# Patient Record
Sex: MALE | Race: BLACK OR AFRICAN AMERICAN | NOT HISPANIC OR LATINO | Employment: OTHER | URBAN - METROPOLITAN AREA
[De-identification: names, ages, dates, MRNs, and addresses within clinical notes are randomized per-mention and may not be internally consistent; named-entity substitution may affect disease eponyms.]

---

## 2022-07-28 ENCOUNTER — HOSPITAL ENCOUNTER (EMERGENCY)
Facility: HOSPITAL | Age: 77
Discharge: HOME | End: 2022-07-28
Attending: EMERGENCY MEDICINE
Payer: MEDICARE

## 2022-07-28 VITALS
HEART RATE: 61 BPM | DIASTOLIC BLOOD PRESSURE: 85 MMHG | SYSTOLIC BLOOD PRESSURE: 179 MMHG | HEIGHT: 70 IN | BODY MASS INDEX: 25.48 KG/M2 | TEMPERATURE: 98 F | WEIGHT: 178 LBS | RESPIRATION RATE: 14 BRPM | OXYGEN SATURATION: 97 %

## 2022-07-28 DIAGNOSIS — R91.1 PULMONARY NODULE: ICD-10-CM

## 2022-07-28 DIAGNOSIS — R53.83 FATIGUE, UNSPECIFIED TYPE: Primary | ICD-10-CM

## 2022-07-28 DIAGNOSIS — R53.83 FATIGUE: ICD-10-CM

## 2022-07-28 DIAGNOSIS — R93.89 ABNORMAL CXR: ICD-10-CM

## 2022-07-28 LAB
ALBUMIN SERPL BCP-MCNC: 3.7 G/DL (ref 3.5–5.2)
ALP SERPL-CCNC: 66 U/L (ref 55–135)
ALT SERPL W/O P-5'-P-CCNC: 17 U/L (ref 10–44)
ANION GAP SERPL CALC-SCNC: 9 MMOL/L (ref 8–16)
AST SERPL-CCNC: 21 U/L (ref 10–40)
BASOPHILS # BLD AUTO: 0.02 K/UL (ref 0–0.2)
BASOPHILS NFR BLD: 0.3 % (ref 0–1.9)
BILIRUB SERPL-MCNC: 1.2 MG/DL (ref 0.1–1)
BILIRUB UR QL STRIP: NEGATIVE
BNP SERPL-MCNC: 32 PG/ML (ref 0–99)
BUN SERPL-MCNC: 10 MG/DL (ref 8–23)
CALCIUM SERPL-MCNC: 9.6 MG/DL (ref 8.7–10.5)
CHLORIDE SERPL-SCNC: 107 MMOL/L (ref 95–110)
CLARITY UR: CLEAR
CO2 SERPL-SCNC: 26 MMOL/L (ref 23–29)
COLOR UR: YELLOW
CREAT SERPL-MCNC: 0.9 MG/DL (ref 0.5–1.4)
CTP QC/QA: YES
DIFFERENTIAL METHOD: ABNORMAL
EOSINOPHIL # BLD AUTO: 0.1 K/UL (ref 0–0.5)
EOSINOPHIL NFR BLD: 1.3 % (ref 0–8)
ERYTHROCYTE [DISTWIDTH] IN BLOOD BY AUTOMATED COUNT: 12.2 % (ref 11.5–14.5)
EST. GFR  (AFRICAN AMERICAN): >60 ML/MIN/1.73 M^2
EST. GFR  (NON AFRICAN AMERICAN): >60 ML/MIN/1.73 M^2
GLUCOSE SERPL-MCNC: 104 MG/DL (ref 70–110)
GLUCOSE UR QL STRIP: NEGATIVE
HCT VFR BLD AUTO: 40.6 % (ref 40–54)
HGB BLD-MCNC: 14.1 G/DL (ref 14–18)
HGB UR QL STRIP: NEGATIVE
IMM GRANULOCYTES # BLD AUTO: 0.01 K/UL (ref 0–0.04)
IMM GRANULOCYTES NFR BLD AUTO: 0.2 % (ref 0–0.5)
KETONES UR QL STRIP: NEGATIVE
LEUKOCYTE ESTERASE UR QL STRIP: NEGATIVE
LYMPHOCYTES # BLD AUTO: 1.1 K/UL (ref 1–4.8)
LYMPHOCYTES NFR BLD: 17.2 % (ref 18–48)
MAGNESIUM SERPL-MCNC: 1.7 MG/DL (ref 1.6–2.6)
MCH RBC QN AUTO: 29.9 PG (ref 27–31)
MCHC RBC AUTO-ENTMCNC: 34.7 G/DL (ref 32–36)
MCV RBC AUTO: 86 FL (ref 82–98)
MONOCYTES # BLD AUTO: 0.5 K/UL (ref 0.3–1)
MONOCYTES NFR BLD: 8.3 % (ref 4–15)
NEUTROPHILS # BLD AUTO: 4.6 K/UL (ref 1.8–7.7)
NEUTROPHILS NFR BLD: 72.7 % (ref 38–73)
NITRITE UR QL STRIP: NEGATIVE
NRBC BLD-RTO: 0 /100 WBC
PH UR STRIP: 7 [PH] (ref 5–8)
PLATELET # BLD AUTO: 161 K/UL (ref 150–450)
PMV BLD AUTO: 9.1 FL (ref 9.2–12.9)
POTASSIUM SERPL-SCNC: 3.8 MMOL/L (ref 3.5–5.1)
PROT SERPL-MCNC: 6.6 G/DL (ref 6–8.4)
PROT UR QL STRIP: NEGATIVE
RBC # BLD AUTO: 4.72 M/UL (ref 4.6–6.2)
SARS-COV-2 RDRP RESP QL NAA+PROBE: NEGATIVE
SODIUM SERPL-SCNC: 142 MMOL/L (ref 136–145)
SP GR UR STRIP: 1.02 (ref 1–1.03)
TROPONIN I SERPL DL<=0.01 NG/ML-MCNC: 0.01 NG/ML (ref 0–0.03)
TSH SERPL DL<=0.005 MIU/L-ACNC: 1.96 UIU/ML (ref 0.4–4)
URN SPEC COLLECT METH UR: ABNORMAL
UROBILINOGEN UR STRIP-ACNC: ABNORMAL EU/DL
WBC # BLD AUTO: 6.29 K/UL (ref 3.9–12.7)

## 2022-07-28 PROCEDURE — 93005 ELECTROCARDIOGRAM TRACING: CPT

## 2022-07-28 PROCEDURE — 84443 ASSAY THYROID STIM HORMONE: CPT | Performed by: EMERGENCY MEDICINE

## 2022-07-28 PROCEDURE — U0002 COVID-19 LAB TEST NON-CDC: HCPCS | Performed by: EMERGENCY MEDICINE

## 2022-07-28 PROCEDURE — 93010 ELECTROCARDIOGRAM REPORT: CPT | Mod: ,,, | Performed by: INTERNAL MEDICINE

## 2022-07-28 PROCEDURE — 84484 ASSAY OF TROPONIN QUANT: CPT | Performed by: EMERGENCY MEDICINE

## 2022-07-28 PROCEDURE — 80053 COMPREHEN METABOLIC PANEL: CPT | Performed by: EMERGENCY MEDICINE

## 2022-07-28 PROCEDURE — 81003 URINALYSIS AUTO W/O SCOPE: CPT | Performed by: EMERGENCY MEDICINE

## 2022-07-28 PROCEDURE — 93010 EKG 12-LEAD: ICD-10-PCS | Mod: ,,, | Performed by: INTERNAL MEDICINE

## 2022-07-28 PROCEDURE — 99285 EMERGENCY DEPT VISIT HI MDM: CPT | Mod: 25

## 2022-07-28 PROCEDURE — 85025 COMPLETE CBC W/AUTO DIFF WBC: CPT | Performed by: EMERGENCY MEDICINE

## 2022-07-28 PROCEDURE — 83880 ASSAY OF NATRIURETIC PEPTIDE: CPT | Performed by: EMERGENCY MEDICINE

## 2022-07-28 PROCEDURE — 83735 ASSAY OF MAGNESIUM: CPT | Performed by: EMERGENCY MEDICINE

## 2022-07-28 NOTE — ED PROVIDER NOTES
"Encounter Date: 7/28/2022    SCRIBE #1 NOTE: I, Sabi Sands, am scribing for, and in the presence of,  Helen Angulo MD. I have scribed the following portions of the note - Other sections scribed: HPI, ROS, PE.       History     Chief Complaint   Patient presents with    Fall     Pt presents to ED c/o fall occurred around 2100 last night. Pt also c/o neck pain. Pt reports hitting the left side of the head on concrete.  Pt reports taking out the trash.  Denies loc, n/v, ha, dizziness, cp, sob, numbness or tingling.  Denies taking blood thinner or taking any medication to relieve the pain. 5/10.      CC: Fall    HPI: This is a 76 y.o. M who presents to the ED for emergent evaluation of acute headache, and mid posterior neck pain after a slip and fall while taking out the garbage at 9:00pm last night. Pt reports hitting his head on the concrete during the fall. He was assisted up off of the ground after the fall. He denies loss of consciousness, or any other trauma during the fall. Per wife, the pt was "out of it" after the fall. The pt normally speaks slow. However, per wife, the pt's speech was slower than usual after the fall. He was monitored by his wife after the fall and the pt's wife allowed the pt to fall asleep 2 hours after the fall. His speech has improved since the fall. He appears groggy since the fall, which also prompted today's ED visit. Per wife, the pt has a Hx of chronic neck pain and believes the neck pain was aggravated after the fall. The pt had a fusion of the neck 10-15 years ago. Pt denies numbness, weakness, or vision changes. Pt has HTN, and high cholesterol. He takes high cholesterol medication, Flomax, Amlodipine, aspirin, Meloxicam, HCTZ, and medication for prostate issue. He is not on blood thinners. He has a Hx of a lung biopsy in the 1970's, in which he had sarcoids and was on prednisone for 2 years. The pt no longer takes prednisone. He has no known drug allergies. He consumes a " glass of wine occasionally, but he last consumed wine 2 weeks ago. The pt was experiencing night sweats, which he was evaluated by his PCP for the night sweats and informed that the night sweats were likely due to consuming wine before bedtime. Since he stopped drinking wine 2 weeks ago, the pt has not had night sweats in 1 week. Pt denies tobacco use, or recreational drug use. Additionally, the pt's wife reports that the pt's blood pressure has been 105/50's for the last few months. She states that his systolic is usually in the 120's. She was concerned about the low blood pressure, so she has not been giving the pt his blood pressure medication lately. Besides voluntarily stopping his blood pressure medication, the pt has not had any recent changes to his medications. The pt has also been generally weak intermittently for a while (6 months), which the pt's wife was concerned that the weakness was due to the low blood pressure. He currently denies weakness. The pt lost 15 lbs within the last 15 months, but was told by his PCP that the weight loss was not concerning. He had labs done by his PCP after concerns of weight loss. His labs were normal at that time. He has an appointment with his PCP, Dr. Gr at [facility] on tomorrow 7/29/2022. Pt denies CP, SOB, or palpitations.    The history is provided by the patient and the spouse. No  was used.     Review of patient's allergies indicates:  No Known Allergies  No past medical history on file.  No past surgical history on file.  No family history on file.     Review of Systems   Constitutional: Positive for fatigue and unexpected weight change. Negative for chills, diaphoresis and fever.   HENT: Negative for drooling, sore throat and voice change.    Eyes: Negative for photophobia and visual disturbance.   Respiratory: Negative for cough, shortness of breath and wheezing.    Cardiovascular: Negative for chest pain, palpitations  and leg swelling.   Gastrointestinal: Negative for abdominal pain, blood in stool, constipation, diarrhea, nausea and vomiting.   Genitourinary: Negative for dysuria, frequency, hematuria and urgency.   Musculoskeletal: Positive for neck pain. Negative for myalgias and neck stiffness.   Skin: Negative for rash and wound.   Neurological: Positive for speech difficulty (resolved) and headaches. Negative for syncope, weakness, light-headedness and numbness.   Hematological: Does not bruise/bleed easily.   Psychiatric/Behavioral: Negative for agitation, confusion and suicidal ideas.   All other systems reviewed and are negative.      Physical Exam     Initial Vitals   BP Pulse Resp Temp SpO2   07/28/22 0645 07/28/22 0645 07/28/22 0645 07/28/22 0645 07/28/22 0721   120/68 72 18 98.1 °F (36.7 °C) 97 %      MAP       --                Physical Exam    Nursing note and vitals reviewed.  Constitutional: He appears well-developed and well-nourished. He is not diaphoretic. No distress.   HENT:   Head: Normocephalic and atraumatic.   Right Ear: External ear normal.   Left Ear: External ear normal.   Mouth/Throat: Oropharynx is clear and moist. No oropharyngeal exudate.   No signs of head trauma    Eyes: Conjunctivae and EOM are normal. Pupils are equal, round, and reactive to light. Right eye exhibits no discharge. Left eye exhibits no discharge.   Neck: Neck supple. No JVD present.   Normal range of motion.   Full passive range of motion without pain.     Cardiovascular: Normal rate, regular rhythm, normal heart sounds and intact distal pulses. Exam reveals no gallop and no friction rub.    No murmur heard.  Pulmonary/Chest: Breath sounds normal. No respiratory distress. He has no wheezes. He has no rhonchi. He has no rales.   Abdominal: Abdomen is soft. Bowel sounds are normal. He exhibits no distension. There is no abdominal tenderness. There is no rebound and no guarding.   Musculoskeletal:         General: No tenderness or  edema. Normal range of motion.      Cervical back: Full passive range of motion without pain, normal range of motion and neck supple. No spinous process tenderness or muscular tenderness.      Comments: No midline neck or back tenderness      Lymphadenopathy:     He has no cervical adenopathy.   Neurological: He is alert and oriented to person, place, and time. He has normal strength. No cranial nerve deficit or sensory deficit. GCS score is 15. GCS eye subscore is 4. GCS verbal subscore is 5. GCS motor subscore is 6.   Moves all extremities and carries on conversation. CN- II: PERRL; III/IV/VI: EOMI w/out evidence of nystagmus; V: no deficits appreciated to light touch bilateral face; VII: no facial weakness, no facial asymmetry. Eyebrow raise symmetric. Smile symmetric; IX/X: palate midline, and raises symmetrically; XI: shoulder shrug 5/5 bilaterally; XII: tongue is midline w/out asymmetry. Strength 5/5 to bilateral upper and lower extremities, sensation intact to light touch,   Skin: Skin is dry. Capillary refill takes less than 2 seconds.   Psychiatric: He has a normal mood and affect. Thought content normal.         ED Course   Procedures  Labs Reviewed   CBC W/ AUTO DIFFERENTIAL - Abnormal; Notable for the following components:       Result Value    MPV 9.1 (*)     Lymph % 17.2 (*)     All other components within normal limits   COMPREHENSIVE METABOLIC PANEL - Abnormal; Notable for the following components:    Total Bilirubin 1.2 (*)     All other components within normal limits   URINALYSIS, REFLEX TO URINE CULTURE - Abnormal; Notable for the following components:    Urobilinogen, UA 2.0-3.0 (*)     All other components within normal limits    Narrative:     Specimen Source->Urine   MAGNESIUM   TSH   TROPONIN I   B-TYPE NATRIURETIC PEPTIDE   B-TYPE NATRIURETIC PEPTIDE   SARS-COV-2 RDRP GENE          Imaging Results          X-Ray Chest 1 View (Final result)  Result time 07/28/22 08:33:36   Procedure changed  from X-Ray Chest PA And Lateral     Final result by Russell Patel MD (07/28/22 08:33:36)                 Impression:      Nonspecific interstitial coarsening could relate to chronic fibrotic change, in the absence comparison imaging, possibility of infectious or inflammatory etiology difficult to entirely exclude.      Electronically signed by: Russell Patel  Date:    07/28/2022  Time:    08:33             Narrative:    EXAMINATION:  XR CHEST 1 VIEW    CLINICAL HISTORY:  fatigue; Other fatigue    TECHNIQUE:  Single frontal view of the chest was performed.    COMPARISON:  None    FINDINGS:  Monitoring leads overlie the chest.  Cardiomediastinal contours grossly within normal limits.    Nonspecific interstitial coarsening.    No definite pneumothorax or large volume pleural effusion.  No acute findings identified in the visualized abdomen.  Osseous and soft tissue structures appear without definite acute abnormality.                               CT Head Without Contrast (Final result)  Result time 07/28/22 08:26:50    Final result by Russell Patel MD (07/28/22 08:26:50)                 Impression:      1. No acute intracranial findings.  2. No acute cervical spine fracture.  3. Degenerative findings the cervical spine, as described.  4. Incidental left upper lobe solid nodule measuring For a solid nodule <6 mm, Fleischner Society 2017 guidelines recommend no routine follow up for a low risk patient, or follow-up with non-contrast chest CT at 12 months in a high risk patient.      Electronically signed by: Russell Patel  Date:    07/28/2022  Time:    08:26             Narrative:    EXAMINATION:  CT HEAD WITHOUT CONTRAST; CT CERVICAL SPINE WITHOUT CONTRAST    CLINICAL HISTORY:  Head trauma, minor (Age >= 65y);; Neck trauma (Age >= 65y);    TECHNIQUE:  Low dose axial images were obtained through the head and cervical spine.  Coronal and sagittal reformations were also performed. Contrast was not  administered.    COMPARISON:  None.    FINDINGS:  Head:    Blood: No acute intracranial hemorrhage.    Parenchyma: No definite loss of gray-white differentiation to suggest acute or subacute transcortical infarct. Generalized pattern age-related parenchymal volume loss.  Nonspecific areas of white matter hypoattenuation could relate to sequela of chronic small vessel ischemic disease.    Ventricles/Extra-axial spaces: No abnormal extra-axial fluid collection. Basal cisterns are patent.    Vessels: Atherosclerosis    Orbits: Unremarkable.    Scalp: High left paramedian vertex scalp hematoma.    Skull: There are no depressed skull fractures or destructive bone lesions.    Sinuses and mastoids: Partially imaged chronic appearing essentially complete opacification of the right maxillary sinus, which could relate to mucocele.  Hypoplastic right frontal sinus.  Scattered mucosal thickening retention cysts noted elsewhere.    Other findings: TMJ DJD.    Cervical spine:    Fractures: No acute fractures    Alignment: There is no significant vertebral subluxation  Atlanto-axial and atlanto-occipital joints: Atlanto-axial and atlanto-occipital intervals are not widened.  Facet joints: There is no traumatic facet joint widening.  Degenerate facet arthropathy.  Vertebral bodies: Osseous demineralization is suggested.  Degenerative endplate change.  Ankylosis of C6-7 vertebral bodies.  Discs: Disc osteophyte complex formation  Spinal canal and foraminal narrowing: Although CT does not optimally evaluate the soft tissue contents of the spinal canal and foramina, no critical stenosis is suggested.    At C2-3, moderate severe left foraminal narrowing.    At C3-4, severe left foraminal narrowing and mild ventral thecal sac deformity.    At C4-5, mild thecal sac deformity and mild right and moderate left foraminal narrowing.    At C5-6, mild-to-moderate central spinal canal stenosis and moderate right and severe left foraminal  narrowing.    At C6-7 mild bilateral foraminal narrowing.  Mild ventral thecal sac deformity.  Paraspinal soft tissues: Atherosclerosis.    Upper Lungs:Biapical pleuroparenchymal scarring.  More focal 5-6 mm solid nodule left upper lobe (series 3, image 279).                               CT Cervical Spine Without Contrast (Final result)  Result time 07/28/22 08:26:50    Final result by Russell Patel MD (07/28/22 08:26:50)                 Impression:      1. No acute intracranial findings.  2. No acute cervical spine fracture.  3. Degenerative findings the cervical spine, as described.  4. Incidental left upper lobe solid nodule measuring For a solid nodule <6 mm, Fleischner Society 2017 guidelines recommend no routine follow up for a low risk patient, or follow-up with non-contrast chest CT at 12 months in a high risk patient.      Electronically signed by: Russell Patel  Date:    07/28/2022  Time:    08:26             Narrative:    EXAMINATION:  CT HEAD WITHOUT CONTRAST; CT CERVICAL SPINE WITHOUT CONTRAST    CLINICAL HISTORY:  Head trauma, minor (Age >= 65y);; Neck trauma (Age >= 65y);    TECHNIQUE:  Low dose axial images were obtained through the head and cervical spine.  Coronal and sagittal reformations were also performed. Contrast was not administered.    COMPARISON:  None.    FINDINGS:  Head:    Blood: No acute intracranial hemorrhage.    Parenchyma: No definite loss of gray-white differentiation to suggest acute or subacute transcortical infarct. Generalized pattern age-related parenchymal volume loss.  Nonspecific areas of white matter hypoattenuation could relate to sequela of chronic small vessel ischemic disease.    Ventricles/Extra-axial spaces: No abnormal extra-axial fluid collection. Basal cisterns are patent.    Vessels: Atherosclerosis    Orbits: Unremarkable.    Scalp: High left paramedian vertex scalp hematoma.    Skull: There are no depressed skull fractures or destructive bone  lesions.    Sinuses and mastoids: Partially imaged chronic appearing essentially complete opacification of the right maxillary sinus, which could relate to mucocele.  Hypoplastic right frontal sinus.  Scattered mucosal thickening retention cysts noted elsewhere.    Other findings: TMJ DJD.    Cervical spine:    Fractures: No acute fractures    Alignment: There is no significant vertebral subluxation  Atlanto-axial and atlanto-occipital joints: Atlanto-axial and atlanto-occipital intervals are not widened.  Facet joints: There is no traumatic facet joint widening.  Degenerate facet arthropathy.  Vertebral bodies: Osseous demineralization is suggested.  Degenerative endplate change.  Ankylosis of C6-7 vertebral bodies.  Discs: Disc osteophyte complex formation  Spinal canal and foraminal narrowing: Although CT does not optimally evaluate the soft tissue contents of the spinal canal and foramina, no critical stenosis is suggested.    At C2-3, moderate severe left foraminal narrowing.    At C3-4, severe left foraminal narrowing and mild ventral thecal sac deformity.    At C4-5, mild thecal sac deformity and mild right and moderate left foraminal narrowing.    At C5-6, mild-to-moderate central spinal canal stenosis and moderate right and severe left foraminal narrowing.    At C6-7 mild bilateral foraminal narrowing.  Mild ventral thecal sac deformity.  Paraspinal soft tissues: Atherosclerosis.    Upper Lungs:Biapical pleuroparenchymal scarring.  More focal 5-6 mm solid nodule left upper lobe (series 3, image 279).                                 Medications - No data to display  Medical Decision Making:   Independently Interpreted Test(s):   I have ordered and independently interpreted EKG Reading(s) - see summary below  77 yo patient presenting 2/2 mechanical fall. Patient with pain predominantly to the neck. Hemodynamically stable with a non focal neurological exam. Given exam and history, low suspicion for major  traumatic event. CT head and neck without acute traumatic injury. Serial abdominal exams without tenderness. Observed in the ED for 6 hours with no instability. Stable gait. Patient refused pain medication    Patient also with chronic fatigue, night sweats, weight loss, workup in ED without obvious cause. DDx includes malignancy, TB, metabolic derangement, thyroid dysfunction, anemia.     Discussed with patient CT and xray results (pulm nodule, abnormal CXR) and need to follow up outpatient. PCP visit already scheduled for tomorrow.     Cautious return precautions discussed with patient and/or family with understanding. Prompt f/u with primary care physician discussed. All questions answered. Patient comfortable with plan.               Scribe Attestation:   Scribe #1: I performed the above scribed service and the documentation accurately describes the services I performed. I attest to the accuracy of the note.        ED Course as of 07/28/22 1503   Thu Jul 28, 2022   0736 EKG 12-lead  Sinus bradycardia 59. No ST elevation or depression.  Normal axis.  QTC is 413. T-wave inversions in 3 and V1.   [JT]      ED Course User Index  [JT] Helen Angulo MD           I, Helen Angulo, personally performed the services described in this documentation. All medical record entries made by the scribe were at my direction and in my presence. I have reviewed the chart and agree that the record reflects my personal performance and is accurate and complete.  Clinical Impression:   Final diagnoses:  [R53.83] Fatigue  [R53.83] Fatigue, unspecified type (Primary)  [R93.89] Abnormal CXR  [R91.1] Pulmonary nodule          ED Disposition Condition    Discharge Stable        ED Prescriptions     None        Follow-up Information     Follow up With Specialties Details Why Contact Info    [facility] Emergency Medicine  As needed, If symptoms worsen [address] [place] [address] [phone]    Your  Primary Care Doctor  Go in 1 day as previously scheduled            Helen Angulo MD  07/28/22 6291

## 2022-07-28 NOTE — ED NOTES
Pt awaiting wheelchair for discharge. No wheelchair at this time. Transport called. Charge nurse aware.

## 2022-07-28 NOTE — DISCHARGE INSTRUCTIONS
Please follow up with primary care doctor tomorrow to discuss fatigue for further work up. Discuss pulmonary nodule and abnormal CXR as this will need follow up outpatient. Return to ED for any worsening symptoms, chest pain, shortness of breath, fever or any other concerns.     Thank you for coming to our Emergency Department today. As we discussed, it is important to remember that some problems are difficult to diagnose and may not be found during your Emergency Department visit. Be sure to follow up with your primary care doctor and review all labs/imaging/tests that were performed during this visit with them. Some labs/tests may be outside of the normal range and require non-emergent follow-up and further investigation to help diagnose/exclude/prevent complications or other medical conditions.    X-Ray Chest 1 View   Final Result      Nonspecific interstitial coarsening could relate to chronic fibrotic change, in the absence comparison imaging, possibility of infectious or inflammatory etiology difficult to entirely exclude.         Electronically signed by: Russell Patel   Date:    07/28/2022   Time:    08:33      CT Head Without Contrast   Final Result      1. No acute intracranial findings.   2. No acute cervical spine fracture.   3. Degenerative findings the cervical spine, as described.   4. Incidental left upper lobe solid nodule measuring For a solid nodule <6 mm, Fleischner Society 2017 guidelines recommend no routine follow up for a low risk patient, or follow-up with non-contrast chest CT at 12 months in a high risk patient.         Electronically signed by: Russell Patel   Date:    07/28/2022   Time:    08:26      CT Cervical Spine Without Contrast   Final Result      1. No acute intracranial findings.   2. No acute cervical spine fracture.   3. Degenerative findings the cervical spine, as described.   4. Incidental left upper lobe solid nodule measuring For a solid nodule <6 mm, Fleischner Society  2017 guidelines recommend no routine follow up for a low risk patient, or follow-up with non-contrast chest CT at 12 months in a high risk patient.         Electronically signed by: Russell Patel   Date:    07/28/2022   Time:    08:26          If you do not have a primary care doctor, you may contact the one listed on your discharge paperwork or you may also call the [facility] Appointment Desk at [phone] to schedule an appointment and establish care with one. It is important to your health that you have a primary care doctor.    Please take all medications as directed. All medications may potentially have side-effects and it is impossible to predict which medications may give you side-effects or what side-effects (if any) they will give you.. If you feel that you are having a negative effect or side-effect of any medication you should immediately stop taking them and seek medical attention. If you feel that you are having a life-threatening reaction call 911.    Return to the ER with any questions/concerns, new/concerning symptoms, worsening or failure to improve.     Do not drive, swim, climb to height, take a bath or make any important decisions for 24 hours if you have received any pain medications, sedatives or mood altering drugs during your ER visit.

## 2024-04-02 ENCOUNTER — HOSPITAL ENCOUNTER (EMERGENCY)
Facility: HOSPITAL | Age: 79
Discharge: HOME | End: 2024-04-02
Attending: EMERGENCY MEDICINE
Payer: MEDICARE

## 2024-04-02 VITALS
DIASTOLIC BLOOD PRESSURE: 56 MMHG | HEIGHT: 70 IN | OXYGEN SATURATION: 99 % | RESPIRATION RATE: 18 BRPM | WEIGHT: 178 LBS | BODY MASS INDEX: 25.48 KG/M2 | HEART RATE: 64 BPM | TEMPERATURE: 97 F | SYSTOLIC BLOOD PRESSURE: 116 MMHG

## 2024-04-02 DIAGNOSIS — S09.90XA MINOR HEAD INJURY, INITIAL ENCOUNTER: ICD-10-CM
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
## 2024-04-02 DIAGNOSIS — S00.81XA FOREHEAD ABRASION, INITIAL ENCOUNTER: ICD-10-CM

## 2024-04-02 PROCEDURE — 99283 EMERGENCY DEPT VISIT LOW MDM: CPT | Mod: 25

## 2024-04-02 NOTE — ED PROVIDER NOTES
Encounter Date: 4/2/2024       History     Chief Complaint   Patient presents with    Fall     Pt to ED via POV with complaints of fall right PTA. Pt wife at side reporting fall was witnessed- pt lost balance and hit his head on floor. Pt did not lose consciousness but does take blood thinners. Reports headache but denies blurred vision, slurred speech, numbness/tingling to extremities. 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 Pt reportedly fell, striking his head. No LOC or other injuries. No preceding lightheadedness or chest pain. No fever, chills, chest pain, shortness of breath, nausea, or vomiting. Pt has been ambulatory since the fall.     The history is provided by the patient and the spouse.     Review of patient's allergies indicates:  No Known Allergies  No past medical history on file.  No past surgical history on file.  No family history on file.     Review of Systems    Physical Exam     Initial Vitals [04/02/24 1222]   BP Pulse Resp Temp SpO2   (!) 116/56 64 18 97.3 °F (36.3 °C) 99 %      MAP       --         Physical Exam    Nursing note and vitals reviewed.  Constitutional: He appears well-developed and well-nourished. No distress.   HENT:   Head: Normocephalic and atraumatic.   Mouth/Throat: Oropharynx is clear and moist.   Eyes: EOM are normal. Pupils are equal, round, and reactive to light.   Neck: Neck supple. No tracheal deviation present.   Normal range of motion.  Cardiovascular:  Normal rate, regular rhythm and intact distal pulses.           No murmur heard.  Pulmonary/Chest: Breath sounds normal. No stridor. No respiratory distress. He has no wheezes.   Abdominal: Abdomen is soft. He exhibits no distension. There is no abdominal tenderness.   Musculoskeletal:         General: No edema. Normal range of motion.      Cervical back: Normal  range of motion and neck supple.      Comments: No C, T, or L spine tenderness to palpation. FROM to upper extremities bilaterally without tenderness. FROM to bilateral hips with tenderness      Neurological: He is alert and oriented to person, place, and time. He has normal strength. No cranial nerve deficit or sensory deficit.   Skin: Skin is warm and dry. Capillary refill takes less than 2 seconds.   Left forehead abrasion          ED Course   Procedures  Labs Reviewed - No data to display       Imaging Results              CT Head Without Contrast (Final result)  Result time 04/02/24 13:45:19      Final result by Aquilino Hightower MD (04/02/24 13:45:19)                   Impression:      Head CT:    No acute intracranial abnormality identified.    Additional chronic findings as above.    Cervical spine CT:    No CT evidence of cervical spine acute osseous traumatic injury.    Additional chronic findings as above.      Electronically signed by: Aquilino Hightower MD  Date:    04/02/2024  Time:    13:45               Narrative:    EXAMINATION:  CT HEAD WITHOUT CONTRAST; CT CERVICAL SPINE WITHOUT CONTRAST    CLINICAL HISTORY:  Head trauma, minor (Age >= 65y);; Neck trauma (Age >= 65y);    TECHNIQUE:  Low dose axial CT images obtained throughout the head and cervical spine without intravenous contrast. Sagittal and coronal reconstructions were performed.    COMPARISON:  Head and cervical spine CT 07/28/2022, reports only for head CT from an outside facility dated 07/17/2023 and MRI brain from an outside facility dated 08/02/2023    FINDINGS:  Patient is somewhat rotated and tilted within the scanner.    Head CT:    Intracranial compartment: There is moderate-sized encephalomalacia at the right frontal lobe and to a lesser extent anterior right insular cortex/external capsule and basal ganglia subjacent to a prior right frontal craniotomy site with associated gliosis, and left corona radiata/basal ganglia remote lacunar  type infarct, which are new from 07/28/2022 but reportedly present on comparison recent outside facility study reports as listed above.  Associated slight ex vacuole dilatation of the frontal horns of the bilateral lateral ventricles.    The ventricles are otherwise midline without distortion by mass effect or acute hydrocephalus.    Generalized cerebral volume loss, slightly progressed since available comparison 07/28/2022 study.  Periventricular white matter hypoattenuation likely sequela of chronic microvascular ischemic change also slightly progressed from available comparison 07/28/2022 study.    Grossly similar right more than left bilateral inferior frontal lobe encephalomalacia likely sequela of remote trauma versus remote HARPREET territory infarcts.    No extra-axial blood or fluid collections.  No parenchymal mass, hemorrhage, edema or acute major vascular distribution infarct.  Advanced calcific atherosclerosis at the bilateral calf since supraclinoid ICAs.    Skull/extracranial contents (limited evaluation): Remote appearing postoperative changes of previous right hemicraniotomy with harris hole.  No acute fracture.  Minimal fluid in the left mastoid air cells.  Mucosal thickening within the right maxillary sinus.  Right mastoid air cells and remaining imaged paranasal sinuses are essentially clear.  Imaged portions of the orbits are within normal limits.    Cervical spine CT: There is dextrocurvature with reversal of cervical lordosis centered at C5 a there is osseous fusion at right C4-5 and to a lesser extent C5-6 facets, as well as interbody fusion at C5-6 level.  Vertebral body heights are relatively maintained placed fracture, dislocation or significant listhesis.  No destructive osseous process.  No prevertebral soft tissue thickening.  No paraspinal mass or fluid collection.  No subcutaneous emphysema or radiodense retained foreign body.  Moderate degenerative change at the atlantodental interval.   Dens and lateral masses are otherwise grossly intact.  Multilevel degenerative disc disease with uncovertebral and facet arthrosis and marginal osteophytes most prominent at C4-5 and C5-6 levels, minimally progressed from 07/28/2022 study.    Included airway is relatively midline and patent.  Small outpouching at the posterior right aspect of the trachea level of T2, likely small tracheal diverticulum biapical pleuroparenchymal scarring with bronchiectasis and scattered areas of platelike scarring versus atelectasis grossly similar prior study.  No pneumothorax.  Previously reported 5-6 mm solid nodule within the peripheral left upper lobe is unchanged.  Scattered atherosclerotic vascular calcifications noted.                                       CT Cervical Spine Without Contrast (Final result)  Result time 04/02/24 13:45:19      Final result by Aquilino Hightower MD (04/02/24 13:45:19)                   Impression:      Head CT:    No acute intracranial abnormality identified.    Additional chronic findings as above.    Cervical spine CT:    No CT evidence of cervical spine acute osseous traumatic injury.    Additional chronic findings as above.      Electronically signed by: Aquilino Hightower MD  Date:    04/02/2024  Time:    13:45               Narrative:    EXAMINATION:  CT HEAD WITHOUT CONTRAST; CT CERVICAL SPINE WITHOUT CONTRAST    CLINICAL HISTORY:  Head trauma, minor (Age >= 65y);; Neck trauma (Age >= 65y);    TECHNIQUE:  Low dose axial CT images obtained throughout the head and cervical spine without intravenous contrast. Sagittal and coronal reconstructions were performed.    COMPARISON:  Head and cervical spine CT 07/28/2022, reports only for head CT from an outside facility dated 07/17/2023 and MRI brain from an outside facility dated 08/02/2023    FINDINGS:  Patient is somewhat rotated and tilted within the scanner.    Head CT:    Intracranial compartment: There is moderate-sized encephalomalacia at the  right frontal lobe and to a lesser extent anterior right insular cortex/external capsule and basal ganglia subjacent to a prior right frontal craniotomy site with associated gliosis, and left corona radiata/basal ganglia remote lacunar type infarct, which are new from 07/28/2022 but reportedly present on comparison recent outside facility study reports as listed above.  Associated slight ex vacuole dilatation of the frontal horns of the bilateral lateral ventricles.    The ventricles are otherwise midline without distortion by mass effect or acute hydrocephalus.    Generalized cerebral volume loss, slightly progressed since available comparison 07/28/2022 study.  Periventricular white matter hypoattenuation likely sequela of chronic microvascular ischemic change also slightly progressed from available comparison 07/28/2022 study.    Grossly similar right more than left bilateral inferior frontal lobe encephalomalacia likely sequela of remote trauma versus remote HARPREET territory infarcts.    No extra-axial blood or fluid collections.  No parenchymal mass, hemorrhage, edema or acute major vascular distribution infarct.  Advanced calcific atherosclerosis at the bilateral calf since supraclinoid ICAs.    Skull/extracranial contents (limited evaluation): Remote appearing postoperative changes of previous right hemicraniotomy with harris hole.  No acute fracture.  Minimal fluid in the left mastoid air cells.  Mucosal thickening within the right maxillary sinus.  Right mastoid air cells and remaining imaged paranasal sinuses are essentially clear.  Imaged portions of the orbits are within normal limits.    Cervical spine CT: There is dextrocurvature with reversal of cervical lordosis centered at C5 a there is osseous fusion at right C4-5 and to a lesser extent C5-6 facets, as well as interbody fusion at C5-6 level.  Vertebral body heights are relatively maintained placed fracture, dislocation or significant listhesis.  No  destructive osseous process.  No prevertebral soft tissue thickening.  No paraspinal mass or fluid collection.  No subcutaneous emphysema or radiodense retained foreign body.  Moderate degenerative change at the atlantodental interval.  Dens and lateral masses are otherwise grossly intact.  Multilevel degenerative disc disease with uncovertebral and facet arthrosis and marginal osteophytes most prominent at C4-5 and C5-6 levels, minimally progressed from 07/28/2022 study.    Included airway is relatively midline and patent.  Small outpouching at the posterior right aspect of the trachea level of T2, likely small tracheal diverticulum biapical pleuroparenchymal scarring with bronchiectasis and scattered areas of platelike scarring versus atelectasis grossly similar prior study.  No pneumothorax.  Previously reported 5-6 mm solid nodule within the peripheral left upper lobe is unchanged.  Scattered atherosclerotic vascular calcifications noted.                                       Medications - No data to display  Medical Decision Making  Pt presents following a fall. Ddx: skull fracture, SDH, EDH, cervical fracture, spinous dislocation. Pt well appearing and in no acute distress. Abrasion noted to left forehead. Tetanus up to date. No indication for lac repair. CT head and neck negative for acute process. No other injuries noted. Plan to discharge to home. Return precautions advised               Attending Attestation:   Physician Attestation Statement for Resident:  As the supervising MD   Physician Attestation Statement: I have personally seen and examined this patient.   I agree with the above history.  -:   As the supervising MD I agree with the above PE.     As the supervising MD I agree with the above treatment, course, plan, and disposition.   -: Pt with mechanical fall. Hit head. At baseline mental status per family. Evidence of trauma above the clavicle. No evidence of trauma below the clavicles.   Considered spinal cord injury, intrathoracic injury, intra-abdominal injury, long bone injuries.  There appears to be no evidence of this.  CT head and neck shows no fractures or acute injuries.  Patient has a baseline normal neurologic exam.  I was personally present during the entire procedure.  I have reviewed and agree with the residents interpretation of the following: CT scans.                                        Clinical Impression:  Final diagnoses:  [W19.XXXA] Fall, initial encounter (Primary)  [S09.90XA] Minor head injury, initial encounter  [S00.81XA] Forehead abrasion, initial encounter          ED Disposition Condition    Discharge Stable          ED Prescriptions    None       Follow-up Information       Follow up With Specialties Details Why Contact Info    your primary care doctor  In 2 days If symptoms worsen     [facility] Emergency Dept Emergency Medicine In 1 week  [address] [facility] [place] [address]  [phone]             Darrin Khan Jr., MD  Resident  04/02/24 1519       Tanvir Garcia MD  04/10/24 1210

## 2024-08-03 ENCOUNTER — HOSPITAL ENCOUNTER (EMERGENCY)
Facility: HOSPITAL | Age: 79
Discharge: HOME | End: 2024-08-03
Attending: EMERGENCY MEDICINE
Payer: MEDICARE

## 2024-08-03 VITALS
DIASTOLIC BLOOD PRESSURE: 72 MMHG | HEIGHT: 71 IN | TEMPERATURE: 99 F | SYSTOLIC BLOOD PRESSURE: 168 MMHG | HEART RATE: 71 BPM | WEIGHT: 160 LBS | BODY MASS INDEX: 22.4 KG/M2 | RESPIRATION RATE: 19 BRPM | OXYGEN SATURATION: 98 %

## 2024-08-03 DIAGNOSIS — U07.1 COVID-19 VIRUS INFECTION: Primary | ICD-10-CM

## 2024-08-03 DIAGNOSIS — U07.1 COVID-19 VIRUS DETECTED: ICD-10-CM

## 2024-08-03 PROBLEM — D86.0 PULMONARY SARCOIDOSIS: Status: ACTIVE | Noted: 2024-08-03

## 2024-08-03 LAB
CTP QC/QA: YES
SARS-COV-2 RDRP RESP QL NAA+PROBE: POSITIVE

## 2024-08-03 PROCEDURE — 25000003 PHARM REV CODE 250

## 2024-08-03 PROCEDURE — 99283 EMERGENCY DEPT VISIT LOW MDM: CPT

## 2024-08-03 PROCEDURE — 87635 SARS-COV-2 COVID-19 AMP PRB: CPT

## 2024-08-03 RX ORDER — SENNOSIDES 8.6 MG
1 TABLET ORAL NIGHTLY
COMMUNITY
Start: 2024-05-28

## 2024-08-03 RX ORDER — ACETAMINOPHEN 500 MG
1000 TABLET ORAL
Status: COMPLETED | OUTPATIENT
Start: 2024-08-03 | End: 2024-08-03

## 2024-08-03 RX ORDER — BLOOD SUGAR DIAGNOSTIC
STRIP MISCELLANEOUS
COMMUNITY
Start: 2024-04-16

## 2024-08-03 RX ORDER — AMIODARONE HYDROCHLORIDE 100 MG/1
1 TABLET ORAL DAILY
COMMUNITY
Start: 2024-06-25

## 2024-08-03 RX ORDER — BLOOD-GLUCOSE METER
EACH MISCELLANEOUS
COMMUNITY
Start: 2024-02-27

## 2024-08-03 RX ORDER — LEVETIRACETAM 500 MG/1
1 TABLET ORAL 2 TIMES DAILY
COMMUNITY
Start: 2024-05-23

## 2024-08-03 RX ORDER — PANTOPRAZOLE SODIUM 40 MG/1
40 TABLET, DELAYED RELEASE ORAL
COMMUNITY

## 2024-08-03 RX ORDER — APIXABAN 5 MG/1
TABLET, FILM COATED ORAL
COMMUNITY

## 2024-08-03 RX ORDER — ATORVASTATIN CALCIUM 80 MG/1
TABLET, FILM COATED ORAL
COMMUNITY
Start: 2024-08-01

## 2024-08-03 RX ADMIN — ACETAMINOPHEN 1000 MG: 500 TABLET ORAL at 07:08

## 2024-10-17 ENCOUNTER — PATIENT MESSAGE (OUTPATIENT)
Dept: RESEARCH | Facility: HOSPITAL | Age: 79
End: 2024-10-17
Payer: MEDICARE